# Patient Record
Sex: MALE | Race: WHITE | NOT HISPANIC OR LATINO | Employment: FULL TIME | URBAN - METROPOLITAN AREA
[De-identification: names, ages, dates, MRNs, and addresses within clinical notes are randomized per-mention and may not be internally consistent; named-entity substitution may affect disease eponyms.]

---

## 2017-02-14 ENCOUNTER — NON-PROVIDER VISIT (OUTPATIENT)
Dept: URGENT CARE | Facility: CLINIC | Age: 52
End: 2017-02-14

## 2017-02-14 DIAGNOSIS — Z02.1 PRE-EMPLOYMENT DRUG SCREENING: ICD-10-CM

## 2017-02-14 LAB
AMP AMPHETAMINE: NORMAL
COC COCAINE: NORMAL
INT CON NEG: NORMAL
INT CON POS: NORMAL
MET METHAMPHETAMINES: NORMAL
OPI OPIATES: NORMAL
PCP PHENCYCLIDINE: NORMAL
POC DRUG COMMENT 753798-OCCUPATIONAL HEALTH: NORMAL
THC: NORMAL

## 2017-02-14 PROCEDURE — 80305 DRUG TEST PRSMV DIR OPT OBS: CPT | Performed by: PHYSICIAN ASSISTANT

## 2021-01-08 ENCOUNTER — OFFICE VISIT (OUTPATIENT)
Dept: URGENT CARE | Facility: PHYSICIAN GROUP | Age: 56
End: 2021-01-08
Payer: MEDICAID

## 2021-01-08 VITALS
BODY MASS INDEX: 26.37 KG/M2 | TEMPERATURE: 100.1 F | HEIGHT: 68 IN | HEART RATE: 95 BPM | DIASTOLIC BLOOD PRESSURE: 90 MMHG | RESPIRATION RATE: 12 BRPM | WEIGHT: 174 LBS | SYSTOLIC BLOOD PRESSURE: 152 MMHG | OXYGEN SATURATION: 98 %

## 2021-01-08 DIAGNOSIS — M79.671 FOOT PAIN, BILATERAL: ICD-10-CM

## 2021-01-08 DIAGNOSIS — G25.81 RESTLESS LEGS SYNDROME (RLS): ICD-10-CM

## 2021-01-08 DIAGNOSIS — M79.672 FOOT PAIN, BILATERAL: ICD-10-CM

## 2021-01-08 LAB
GLUCOSE BLD-MCNC: 94 MG/DL (ref 70–100)
HBA1C MFR BLD: 5.8 % (ref 0–5.6)
INT CON NEG: NEGATIVE
INT CON POS: POSITIVE

## 2021-01-08 PROCEDURE — 99204 OFFICE O/P NEW MOD 45 MIN: CPT | Performed by: FAMILY MEDICINE

## 2021-01-08 PROCEDURE — 82962 GLUCOSE BLOOD TEST: CPT | Performed by: FAMILY MEDICINE

## 2021-01-08 PROCEDURE — 83036 HEMOGLOBIN GLYCOSYLATED A1C: CPT | Performed by: FAMILY MEDICINE

## 2021-01-08 RX ORDER — PRAMIPEXOLE DIHYDROCHLORIDE 0.12 MG/1
TABLET ORAL
Qty: 60 TAB | Refills: 1 | Status: SHIPPED | OUTPATIENT
Start: 2021-01-08

## 2021-01-08 NOTE — PROGRESS NOTES
Chief Complaint:    Chief Complaint   Patient presents with   • Foot Problem     both feet, were swelling, tingling feeling, x2-3 weeks        History of Present Illness:    This is a new problem. For 2-3 weeks, he has discomfort in both feet, fluctuates, seems worse at night. Has been using a lot of different OTC meds for symptoms. He feels that when he consumes sugar, the symptom is worse. No injury or trauma. He has been running for years. He currently feels like his feet want to vibrate or move.      Review of Systems:    Constitutional: Negative for fever, chills, and diaphoresis.   Eyes: Negative for change in vision, photophobia, pain, redness, and discharge.  ENT: Negative for ear pain, ear discharge, hearing loss, tinnitus, nasal congestion, nosebleeds, and sore throat.    Respiratory: Negative for cough, hemoptysis, sputum production, shortness of breath, wheezing, and stridor.    Cardiovascular: Negative for chest pain, palpitations, orthopnea, claudication, leg swelling, and PND.   Gastrointestinal: Negative for abdominal pain, nausea, vomiting, diarrhea, constipation, blood in stool, and melena.   Genitourinary: Negative for dysuria, urinary urgency, urinary frequency, hematuria, and flank pain.   Musculoskeletal: See HPI.   Skin: Negative for rash and itching.   Neurological: See HPI.  Endo: Negative for polydipsia.   Heme: Does not bruise/bleed easily.   Psychiatric/Behavioral: Negative for depression, suicidal ideas, hallucinations, and memory loss. The patient is not nervous/anxious and does not have insomnia.        Past Medical History:    Past Medical History:   Diagnosis Date   • ETOH abuse      Past Surgical History:    History reviewed. No pertinent surgical history.    Social History:    Social History     Socioeconomic History   • Marital status: Single     Spouse name: Not on file   • Number of children: Not on file   • Years of education: Not on file   • Highest education level: Not on  "file   Occupational History   • Not on file   Social Needs   • Financial resource strain: Not on file   • Food insecurity     Worry: Not on file     Inability: Not on file   • Transportation needs     Medical: Not on file     Non-medical: Not on file   Tobacco Use   • Smoking status: Never Smoker   Substance and Sexual Activity   • Alcohol use: Not Currently     Comment: ABUSE   • Drug use: No   • Sexual activity: Not on file   Lifestyle   • Physical activity     Days per week: Not on file     Minutes per session: Not on file   • Stress: Not on file   Relationships   • Social connections     Talks on phone: Not on file     Gets together: Not on file     Attends Voodoo service: Not on file     Active member of club or organization: Not on file     Attends meetings of clubs or organizations: Not on file     Relationship status: Not on file   • Intimate partner violence     Fear of current or ex partner: Not on file     Emotionally abused: Not on file     Physically abused: Not on file     Forced sexual activity: Not on file   Other Topics Concern   • Not on file   Social History Narrative   • Not on file     Family History:    History reviewed. No pertinent family history.    Medications:    No current outpatient medications on file prior to visit.     No current facility-administered medications on file prior to visit.      Allergies:    No Known Allergies      Vitals:    Vitals:    01/08/21 1257   BP: 152/90   Pulse: 95   Resp: 12   Temp: 37.8 °C (100.1 °F)   TempSrc: Temporal   SpO2: 98%   Weight: 78.9 kg (174 lb)   Height: 1.727 m (5' 8\")       Physical Exam:    Constitutional: Vital signs reviewed. Appears well-developed and well-nourished. No acute distress.   Eyes: Sclera white, conjunctivae clear.  ENT: External ears normal. Hearing normal.  Cardiovascular: Peripheral pulses 2+. No edema.   Pulmonary/Chest: Respirations non-labored.  Musculoskeletal: Normal gait. Normal range of motion. No tenderness to " palpation. No muscular atrophy or weakness.  Neurological: Alert and oriented to person, place, and time. Muscle tone normal. Coordination normal. Light touch and sensation normal.   Skin: No rashes or lesions. Warm, dry, normal turgor.  Psychiatric: Normal mood and affect. Behavior is normal. Judgment and thought content normal.       Diagnostics:    POCT glucose  Order: [id number]  Status:  Final result   Visible to patient:  No (not released) Next appt:  None Dx:  Foot pain, bilateral   Ref Range & Units  1:20 PM   Glucose - Accu-Ck 70 - 100 mg/dL 94          Specimen Collected: 01/08/21  1:20 PM Last Resulted: 01/08/21  1:27 PM           POCT Hemoglobin A1C  Order: [id number]  Status:  Final result   Visible to patient:  No (not released) Next appt:  None Dx:  Foot pain, bilateral   Ref Range & Units  1:43 PM 3yr ago 4yr ago   Glycohemoglobin 0.0 - 5.6 % 5.8Abnormal       Internal Control Negative  Negative  Valid  Valid    Internal Control Positive  Positive  Valid  Valid          Specimen Collected: 01/08/21  1:43 PM Last Resulted: 01/08/21  1:43 PM             Assessment / Plan:    1. Foot pain, bilateral  - POCT glucose  - POCT Hemoglobin A1C  - pramipexole (MIRAPEX) 0.125 MG Tab; 1 tab once daily 2 to 3 hours before bedtime. May increase to 2 pills after 4-7 days if tolerating medication and foot/leg symptoms still bothersome.  Dispense: 60 Tab; Refill: 1  - REFERRAL TO NEUROLOGY    2. Restless legs syndrome (RLS)  - pramipexole (MIRAPEX) 0.125 MG Tab; 1 tab once daily 2 to 3 hours before bedtime. May increase to 2 pills after 4-7 days if tolerating medication and foot/leg symptoms still bothersome.  Dispense: 60 Tab; Refill: 1  - REFERRAL TO NEUROLOGY      Discussed with him DDX, management options, and risks, benefits, and alternatives to treatment plan agreed upon.    Fingerstick blood sugar and Hgb A1C show unlikely this is related to blood sugar problem.    Possible RLS (info given and discussed) as  cause of symptoms.    Agreeable to medication prescribed and referral to Neurologist ordered for further evaluation and management.    He will return to urgent care if needed.

## 2021-01-21 ENCOUNTER — HOSPITAL ENCOUNTER (OUTPATIENT)
Dept: LAB | Facility: MEDICAL CENTER | Age: 56
End: 2021-01-21
Attending: NURSE PRACTITIONER
Payer: MEDICAID

## 2021-01-21 LAB — VIT B12 SERPL-MCNC: 304 PG/ML (ref 211–911)

## 2021-01-21 PROCEDURE — 84207 ASSAY OF VITAMIN B-6: CPT

## 2021-01-21 PROCEDURE — 36415 COLL VENOUS BLD VENIPUNCTURE: CPT

## 2021-01-21 PROCEDURE — 84591 ASSAY OF NOS VITAMIN: CPT | Mod: XU

## 2021-01-21 PROCEDURE — 82607 VITAMIN B-12: CPT

## 2021-01-21 PROCEDURE — 84425 ASSAY OF VITAMIN B-1: CPT

## 2021-01-24 LAB — VIT B6 SERPL-MCNC: 67.7 NMOL/L (ref 20–125)

## 2021-01-25 LAB — VIT B1 BLD-MCNC: 46 NMOL/L (ref 70–180)

## 2021-02-01 LAB — NIACIN SERPL-MCNC: 4.32 UG/ML (ref 0.5–8.45)
